# Patient Record
Sex: FEMALE | Race: WHITE | NOT HISPANIC OR LATINO | URBAN - METROPOLITAN AREA
[De-identification: names, ages, dates, MRNs, and addresses within clinical notes are randomized per-mention and may not be internally consistent; named-entity substitution may affect disease eponyms.]

---

## 2017-11-21 ENCOUNTER — SURGERY - HEALTHEAST (OUTPATIENT)
Dept: GASTROENTEROLOGY | Facility: CLINIC | Age: 57
End: 2017-11-21

## 2019-08-27 ASSESSMENT — MIFFLIN-ST. JEOR: SCORE: 1668.12

## 2019-08-29 ENCOUNTER — ANESTHESIA - HEALTHEAST (OUTPATIENT)
Dept: SURGERY | Facility: CLINIC | Age: 59
End: 2019-08-29

## 2019-08-29 ENCOUNTER — SURGERY - HEALTHEAST (OUTPATIENT)
Dept: SURGERY | Facility: CLINIC | Age: 59
End: 2019-08-29

## 2019-08-29 ASSESSMENT — MIFFLIN-ST. JEOR: SCORE: 1659.05

## 2021-05-26 ENCOUNTER — RECORDS - HEALTHEAST (OUTPATIENT)
Dept: ADMINISTRATIVE | Facility: CLINIC | Age: 61
End: 2021-05-26

## 2021-05-30 ENCOUNTER — RECORDS - HEALTHEAST (OUTPATIENT)
Dept: ADMINISTRATIVE | Facility: CLINIC | Age: 61
End: 2021-05-30

## 2021-05-31 NOTE — ANESTHESIA PREPROCEDURE EVALUATION
Anesthesia Evaluation      Patient summary reviewed   No history of anesthetic complications     Airway   Mallampati: II  Neck ROM: full   Pulmonary - normal exam    breath sounds clear to auscultation  (+) COPD, sleep apnea, a smoker                         Cardiovascular - normal exam  (+) pacemaker, CAD, , hypercholesterolemia,     ECG reviewed  Rhythm: regular  Rate: normal,      ROS comment: Final Conclusion Previous Study: 8/11/2013   Very technically difficult study.   Normal left ventricular systolic function.   The ejection fraction is visually estimated at 55%.   No regional wall motion abnormalities.   Normal left ventricular size.   The right ventricle is upper normal in size with normal systolic function.   There is mild tricuspid regurgitation.   Estimated pulmonary artery systolic pressure is very mildly increased.   As compared to the echo from 8/13, there have been no major changes.   Estimated EF: 55% 2017     Neuro/Psych    (+) depression, anxiety/panic attacks,     Endo/Other    (+) hypothyroidism, obesity,      GI/Hepatic/Renal - negative ROS           Dental    (+) edentulous                       Anesthesia Plan  Planned anesthetic: peripheral nerve block    ASA 3   Induction: intravenous   Anesthetic plan and risks discussed with: patient  Anesthesia plan special considerations: antiemetics,   Post-op plan: other (RAMSES orders)

## 2021-05-31 NOTE — ANESTHESIA POSTPROCEDURE EVALUATION
Patient: Natali Lutz  LEFT THUMB CARPAL METACARPAL ARTHROPLASTY, WITH FLEXOR CARPI RADIALIS SUSPENSION  Anesthesia type: regional    Patient location: Phase II Recovery  Last vitals:   Vitals Value Taken Time   /55 8/29/2019  1:01 PM   Temp 36.2  C (97.2  F) 8/29/2019 12:00 PM   Pulse 70 8/29/2019  1:45 PM   Resp 20 8/29/2019 12:00 PM   SpO2 98 % 8/29/2019  1:45 PM     Post vital signs: stable  Level of consciousness: awake and responds to simple questions  Post-anesthesia pain: pain controlled  Post-anesthesia nausea and vomiting: no  Pulmonary: unassisted, return to baseline  Cardiovascular: stable and blood pressure at baseline  Hydration: adequate  Anesthetic events: no    QCDR Measures:  ASA# 11 - Shona-op Cardiac Arrest: ASA11B - Patient did NOT experience unanticipated cardiac arrest  ASA# 12 - Shona-op Mortality Rate: ASA12B - Patient did NOT die  ASA# 13 - PACU Re-Intubation Rate: NA - No ETT / LMA used for case  ASA# 10 - Composite Anes Safety: ASA10A - No serious adverse event    Additional Notes:

## 2021-05-31 NOTE — ANESTHESIA CARE TRANSFER NOTE
Last vitals:   Vitals:    08/29/19 1128   BP: 123/55   Pulse: 69   Resp: 12   Temp: 36.3  C (97.4  F)   SpO2: 99%     Patient's level of consciousness is drowsy  Spontaneous respirations: yes  Maintains airway independently: yes  Dentition unchanged: yes  Oropharynx: oropharynx clear of all foreign objects    QCDR Measures:  ASA# 20 - Surgical Safety Checklist: WHO surgical safety checklist completed prior to induction    PQRS# 430 - Adult PONV Prevention: 4558F - Pt received => 2 anti-emetic agents (different classes) preop & intraop  ASA# 8 - Peds PONV Prevention: NA - Not pediatric patient, not GA or 2 or more risk factors NOT present  PQRS# 424 - Shona-op Temp Management: 4559F - At least one body temp DOCUMENTED => 35.5C or 95.9F within required timeframe  PQRS# 426 - PACU Transfer Protocol: - Transfer of care checklist used  ASA# 14 - Acute Post-op Pain: ASA14B - Patient did NOT experience pain >= 7 out of 10

## 2021-05-31 NOTE — ANESTHESIA PROCEDURE NOTES
Peripheral Block    Patient location during procedure: pre-op  Start time: 8/29/2019 10:00 AM  End time: 8/29/2019 10:05 AM  post-op analgesia per surgeon order as noted in medical record  Staffing:  Performing  Anesthesiologist: Kerline Walker MD  Preanesthetic Checklist  Completed: patient identified, site marked, risks, benefits, and alternatives discussed, timeout performed, consent obtained, airway assessed, oxygen available, suction available, emergency drugs available and hand hygiene performed  Peripheral Block  Block type: brachial plexus  Prep: ChloraPrep  Patient monitoring: cardiac monitor, continuous pulse oximetry, heart rate and blood pressure  Laterality: left  Injection technique: ultrasound guided            Needle  Needle type: Stimuplex   Needle gauge: 21 G  Needle length: 4 in  no peripheral nerve catheter placed  Assessment  Injection assessment: no difficulty with injection, negative aspiration for heme, no paresthesia on injection and incremental injection

## 2021-06-03 VITALS — WEIGHT: 248 LBS | HEIGHT: 63 IN | BODY MASS INDEX: 43.94 KG/M2

## 2021-06-21 ENCOUNTER — COMMUNICATION - HEALTHEAST (OUTPATIENT)
Dept: SCHEDULING | Facility: CLINIC | Age: 61
End: 2021-06-21

## 2021-06-22 ENCOUNTER — COMMUNICATION - HEALTHEAST (OUTPATIENT)
Dept: SCHEDULING | Facility: CLINIC | Age: 61
End: 2021-06-22

## 2021-07-05 PROBLEM — R60.0 BILATERAL EDEMA OF LOWER EXTREMITY: Status: ACTIVE | Noted: 2017-06-12

## 2021-07-05 PROBLEM — J96.01 ACUTE RESPIRATORY FAILURE WITH HYPOXIA (H): Status: ACTIVE | Noted: 2020-09-14

## 2021-07-05 PROBLEM — R59.9 ADENOPATHY: Status: ACTIVE | Noted: 2021-06-20

## 2021-07-05 PROBLEM — G47.33 OSA (OBSTRUCTIVE SLEEP APNEA): Status: ACTIVE | Noted: 2021-06-20

## 2021-07-05 PROBLEM — C34.31: Status: ACTIVE | Noted: 2020-11-12

## 2021-07-05 PROBLEM — F11.20 METHADONE DEPENDENCE (H): Status: ACTIVE | Noted: 2020-11-18

## 2021-07-05 PROBLEM — A41.9 SEPSIS (H): Status: ACTIVE | Noted: 2021-06-20
